# Patient Record
Sex: FEMALE | Race: WHITE | NOT HISPANIC OR LATINO
[De-identification: names, ages, dates, MRNs, and addresses within clinical notes are randomized per-mention and may not be internally consistent; named-entity substitution may affect disease eponyms.]

---

## 2018-03-12 ENCOUNTER — TRANSCRIPTION ENCOUNTER (OUTPATIENT)
Age: 23
End: 2018-03-12

## 2021-09-07 ENCOUNTER — OUTPATIENT (OUTPATIENT)
Dept: OUTPATIENT SERVICES | Facility: HOSPITAL | Age: 26
LOS: 1 days | Discharge: HOME | End: 2021-09-07
Payer: COMMERCIAL

## 2021-09-07 ENCOUNTER — RESULT REVIEW (OUTPATIENT)
Age: 26
End: 2021-09-07

## 2021-09-07 DIAGNOSIS — S83.512A SPRAIN OF ANTERIOR CRUCIATE LIGAMENT OF LEFT KNEE, INITIAL ENCOUNTER: ICD-10-CM

## 2021-09-07 DIAGNOSIS — M25.562 PAIN IN LEFT KNEE: ICD-10-CM

## 2021-09-07 PROCEDURE — 73721 MRI JNT OF LWR EXTRE W/O DYE: CPT | Mod: 26,LT

## 2021-09-20 ENCOUNTER — OUTPATIENT (OUTPATIENT)
Dept: OUTPATIENT SERVICES | Facility: HOSPITAL | Age: 26
LOS: 1 days | Discharge: HOME | End: 2021-09-20

## 2021-09-20 DIAGNOSIS — Z11.59 ENCOUNTER FOR SCREENING FOR OTHER VIRAL DISEASES: ICD-10-CM

## 2021-12-19 PROBLEM — Z00.00 ENCOUNTER FOR PREVENTIVE HEALTH EXAMINATION: Status: ACTIVE | Noted: 2021-12-19

## 2021-12-20 ENCOUNTER — APPOINTMENT (OUTPATIENT)
Dept: OBGYN | Facility: CLINIC | Age: 26
End: 2021-12-20

## 2022-01-25 ENCOUNTER — APPOINTMENT (OUTPATIENT)
Dept: OBGYN | Facility: CLINIC | Age: 27
End: 2022-01-25
Payer: COMMERCIAL

## 2022-01-25 ENCOUNTER — NON-APPOINTMENT (OUTPATIENT)
Age: 27
End: 2022-01-25

## 2022-01-25 ENCOUNTER — APPOINTMENT (OUTPATIENT)
Dept: OBGYN | Facility: CLINIC | Age: 27
End: 2022-01-25

## 2022-01-25 VITALS
WEIGHT: 153 LBS | BODY MASS INDEX: 27.45 KG/M2 | HEIGHT: 62.5 IN | HEART RATE: 86 BPM | TEMPERATURE: 98.1 F | DIASTOLIC BLOOD PRESSURE: 80 MMHG | SYSTOLIC BLOOD PRESSURE: 125 MMHG

## 2022-01-25 DIAGNOSIS — Z01.419 ENCOUNTER FOR GYNECOLOGICAL EXAMINATION (GENERAL) (ROUTINE) W/OUT ABNORMAL FINDINGS: ICD-10-CM

## 2022-01-25 DIAGNOSIS — N94.10 UNSPECIFIED DYSPAREUNIA: ICD-10-CM

## 2022-01-25 DIAGNOSIS — Z11.3 ENCOUNTER FOR SCREENING FOR INFECTIONS WITH A PREDOMINANTLY SEXUAL MODE OF TRANSMISSION: ICD-10-CM

## 2022-01-25 DIAGNOSIS — K59.00 CONSTIPATION, UNSPECIFIED: ICD-10-CM

## 2022-01-25 DIAGNOSIS — N94.6 DYSMENORRHEA, UNSPECIFIED: ICD-10-CM

## 2022-01-25 PROCEDURE — 99395 PREV VISIT EST AGE 18-39: CPT | Mod: 25

## 2022-01-25 PROCEDURE — 76830 TRANSVAGINAL US NON-OB: CPT

## 2022-01-25 NOTE — HISTORY OF PRESENT ILLNESS
[Localized] : localized [Menses] : menses [[place]] : intercourse [Bowel Movement] : bowel movement [FreeTextEntry1] : PERIODS ARE VERY PAINFUL AT TIMES...LIKE GETTING STABBED\par NOT AS HEAVY AS PREVIOUSLY\par \par LAST JENNIFER VISIT:\par \par \par    	Print\par Patient\par Name	ANAI LOPES (25yo, F) ID# [id number]	Appt. 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 details\par Prescription: [facility] - Member is eligible. details\par Prescription: [facility] - Member is eligible. details\par Chief Complaint\par Followup: Irregular periods\par \par oral hsv\par Patient's Care Team\par Primary Care Provider: MORENA RAYMUNDO: [address] [place] [address], Ph [phone], Fax [phone] NPI: [id number]\par Patient's Pharmacies\par [id number] [address] [facility] [address], Ph [phone], Fax [phone]\par Vitals\par 2020 12:45 pm\par Ht:	5 ft 2 in\par Wt:	155 lbs\par BMI:	28.3\par Allergies\par Reviewed Allergies\par NKDA\par Medications\par Reviewed Medications\par amoxicillin 875 mg tablet\par 18   filled	[facility]\par Amphetamine Salt Combo 10 mg tablet\par 09/25/15   filled	[facility]\par Amphetamine Salt Combo 20 mg tablet\par 12/10/15   filled	[facility]\par Amphetamine Salt Combo 5 mg tablet\par 02/12/15   filled	[facility]\par cephALEXin 500 mg capsule\par 17   filled	[facility]\par clindamycin 100 mg vaginal suppository\par Insert 1 suppositor(y/ies) every day by vaginal route as directed for 3 days.\par 10/23/17   prescribed	Javier Juarez MD\par clindamycin 2 % vaginal cream\par 10/23/17   filled	[facility]\par meloxicam 15 mg tablet\par 19   filled	[facility]\par metroNIDAZOLE 500 mg tablet\par Take 1 tablet(s) every 12 hours by oral route for 7 days.\par 18   filled	[facility]\par mupirocin 2 % topical ointment\par 17   filled	[facility]\par norethin-ethinyl estradioL-iron 0.8 mg-25 mcg(24)/75 mg(4) chew tablet\par Chew 1 tablet(s) every day by oral route for 84 days.\par 16   prescribed	Javier Juarez MD\par norgestimate-ethinyl estradioL 0.18 mg/0.215mg/0.25mg-35 mcg(28)tablet\par Take 1 tablet(s) by oral route as directed for 84 days.\par 20   prescribed	Javier Juarez MD\par urea 40 % topical cream\par 17   filled	[facility]\par valACYclovir 1 gram tablet\par Take 1 tablet(s) every 12 hours by oral route for 3 days.\par 20   prescribed	Javier Juarez MD\par Problems\par Reviewed Problems\par Dyspareunia\par Dysmenorrhea\par Irregular periods\par Family History\par Reviewed Family History\par Mother	- Well adult\par Father	- Well adult\par Social History\par Reviewed Social History\par Tobacco Smoking Status: Former smoker (Notes: former socially)\par Non-smoker\par Most Recent Tobacco Use Screenin2018\par Surgical History\par Reviewed Surgical History\par NONE\par GYN History\par Reviewed GYN History\par Duration of Flow (days): 5 (Notes: WACKY PAINFUL,).\par LMP: Approximate.\par Frequency of Cycle (Q days): (Notes: IRREG).\par Menses Monthly: Y.\par Flow: Heavy.\par Current Birth Control Method: BCPs.\par Date of LMP: 10/07/2018 (Notes: has irregular periods).\par PERIODS TOTALLY OUT OF CONTROL\par Obstetric History\par Reviewed Obstetric History\par TOTAL	FULL	PRE	AB. I	AB. S	ECTOPICS	MULTIPLE	LIVING\par 0							\par Past Medical History\par Reviewed Past Medical History\par Screening\par None recorded.\par HPI\par menorrhagia/irreg menses/cold sores\par \par ROS\par Patient reports abnormal bleeding but reports no hematuria, no flank pain, no trouble urinating, no incontinence, no rash, no lesion, no discharge, no vaginal odor, and no vaginal itching. She reports no fatigue, no fever, no significant weight gain, and no significant weight loss. She reports no abnormal moles and no rashes. She reports no irritation and no vision changes. She reports no hearing loss, no ear pain, no nose/sinus problems, no sore throat, no snoring, no dry mouth, and no mouth ulcers. She reports no dyspnea / shortness of breath, no cough, no sputum production, no hemoptysis, and no wheezing. She reports no chest pain, no palpitations, and no orthopnea. She reports no heartburn, no dysphagia, no nausea, no vomiting, no abdominal pain, no bowel movement changes, no diarrhea, no constipation, and no rectal bleeding. She reports no menstrual problems and no PMDD symptoms. She reports no menopausal symptoms. She reports no sexual problems. She reports no muscle aches, no muscle weakness, no arthralgias/joint pain, and no back pain. She reports no headaches, no dizziness, no LOC, no weakness, no numbness, and no seizures. She reports no depression, no alcoholism, and no sleep disturbances.\par Assessment / Plan\par 1. Irregular periods -\par 25 minute telehealth visit\par \par N92.6: Irregular menstruation, unspecified\par TELEHEALTH VIDEO CONSULT -     Schedule Within: provider's discretion\par Urgency: irreg menses, cold sores\par \par 2. Menorrhagia -\par will consider: mirena/nexplanon\par \par lengthy discussion about each one\par \par N92.0: Excessive and frequent menstruation with regular cycle\par norgestimate-ethinyl estradiol 0.18 mg/0.215mg/0.25mg-35 mcg(28)tablet - Take 1 tablet(s) by oral route as directed for 84 days.     Qty: 84 tablet(s)     Refills: 1     Pharmacy: [id number]\par \par 3. Lesion of oral mucosa -\par discussed sexual implications in detail re spread\par \par K13.70: Unspecified lesions of oral mucosa\par valacyclovir 1 gram tablet - Take 1 tablet(s) every 12 hours by oral route for 3 days.     Qty: 6 tablet(s)     Refills: 3     Pharmacy: [facility] #[id number]\par \par \par Return to Office\par None recorded.\par Encounter Sign-Off\par Encounter signed-off by Javier Juarez MD, 2020.\par Encounter performed and documented by Javier Juarez MD\par Encounter reviewed & signed by Javier Juarez MD on 2020 at 1:00pm

## 2022-01-25 NOTE — PHYSICAL EXAM
[Examination Of The Breasts] : a normal appearance [No Masses] : no breast masses were palpable [Labia Majora] : normal [Labia Minora] : normal [Normal] : normal [Tenderness] : tender [Uterine Adnexae] : normal

## 2022-01-25 NOTE — PROCEDURE
[Pelvic Pain] : pelvic pain [Transvaginal Ultrasound] : transvaginal ultrasound [FreeTextEntry3] : CERVIX NORMAL\par NO FREE FLUID [FreeTextEntry5] : 54CC VOL.   ENDO;1.9CC [FreeTextEntry7] : 11.6CC [FreeTextEntry8] : 13.5CC

## 2022-01-28 ENCOUNTER — NON-APPOINTMENT (OUTPATIENT)
Age: 27
End: 2022-01-28

## 2022-02-03 ENCOUNTER — NON-APPOINTMENT (OUTPATIENT)
Age: 27
End: 2022-02-03

## 2022-02-06 LAB — CYTOLOGY CVX/VAG DOC THIN PREP: ABNORMAL

## 2022-02-07 ENCOUNTER — NON-APPOINTMENT (OUTPATIENT)
Age: 27
End: 2022-02-07

## 2022-03-04 ENCOUNTER — OUTPATIENT (OUTPATIENT)
Dept: OUTPATIENT SERVICES | Facility: HOSPITAL | Age: 27
LOS: 1 days | Discharge: HOME | End: 2022-03-04

## 2022-03-04 ENCOUNTER — NON-APPOINTMENT (OUTPATIENT)
Age: 27
End: 2022-03-04

## 2022-03-04 VITALS
OXYGEN SATURATION: 100 % | SYSTOLIC BLOOD PRESSURE: 121 MMHG | HEART RATE: 68 BPM | HEIGHT: 63 IN | TEMPERATURE: 99 F | RESPIRATION RATE: 15 BRPM | WEIGHT: 158.95 LBS | DIASTOLIC BLOOD PRESSURE: 67 MMHG

## 2022-03-04 DIAGNOSIS — Z01.818 ENCOUNTER FOR OTHER PREPROCEDURAL EXAMINATION: ICD-10-CM

## 2022-03-04 DIAGNOSIS — N94.6 DYSMENORRHEA, UNSPECIFIED: ICD-10-CM

## 2022-03-04 DIAGNOSIS — Z98.890 OTHER SPECIFIED POSTPROCEDURAL STATES: Chronic | ICD-10-CM

## 2022-03-04 LAB
ALBUMIN SERPL ELPH-MCNC: 4.5 G/DL — SIGNIFICANT CHANGE UP (ref 3.5–5.2)
ALP SERPL-CCNC: 52 U/L — SIGNIFICANT CHANGE UP (ref 30–115)
ALT FLD-CCNC: 18 U/L — SIGNIFICANT CHANGE UP (ref 0–41)
ANION GAP SERPL CALC-SCNC: 9 MMOL/L — SIGNIFICANT CHANGE UP (ref 7–14)
APTT BLD: 37.2 SEC — SIGNIFICANT CHANGE UP (ref 27–39.2)
AST SERPL-CCNC: 19 U/L — SIGNIFICANT CHANGE UP (ref 0–41)
BASOPHILS # BLD AUTO: 0.04 K/UL — SIGNIFICANT CHANGE UP (ref 0–0.2)
BASOPHILS NFR BLD AUTO: 0.5 % — SIGNIFICANT CHANGE UP (ref 0–1)
BILIRUB SERPL-MCNC: 0.5 MG/DL — SIGNIFICANT CHANGE UP (ref 0.2–1.2)
BUN SERPL-MCNC: 11 MG/DL — SIGNIFICANT CHANGE UP (ref 10–20)
CALCIUM SERPL-MCNC: 9.6 MG/DL — SIGNIFICANT CHANGE UP (ref 8.5–10.1)
CHLORIDE SERPL-SCNC: 102 MMOL/L — SIGNIFICANT CHANGE UP (ref 98–110)
CO2 SERPL-SCNC: 27 MMOL/L — SIGNIFICANT CHANGE UP (ref 17–32)
CREAT SERPL-MCNC: 0.9 MG/DL — SIGNIFICANT CHANGE UP (ref 0.7–1.5)
EGFR: 90 ML/MIN/1.73M2 — SIGNIFICANT CHANGE UP
EOSINOPHIL # BLD AUTO: 0.08 K/UL — SIGNIFICANT CHANGE UP (ref 0–0.7)
EOSINOPHIL NFR BLD AUTO: 1 % — SIGNIFICANT CHANGE UP (ref 0–8)
GLUCOSE SERPL-MCNC: 77 MG/DL — SIGNIFICANT CHANGE UP (ref 70–99)
HCT VFR BLD CALC: 42.2 % — SIGNIFICANT CHANGE UP (ref 37–47)
HGB BLD-MCNC: 14.4 G/DL — SIGNIFICANT CHANGE UP (ref 12–16)
IMM GRANULOCYTES NFR BLD AUTO: 0.6 % — HIGH (ref 0.1–0.3)
INR BLD: 1.11 RATIO — SIGNIFICANT CHANGE UP (ref 0.65–1.3)
LYMPHOCYTES # BLD AUTO: 1.91 K/UL — SIGNIFICANT CHANGE UP (ref 1.2–3.4)
LYMPHOCYTES # BLD AUTO: 24.4 % — SIGNIFICANT CHANGE UP (ref 20.5–51.1)
MCHC RBC-ENTMCNC: 31.9 PG — HIGH (ref 27–31)
MCHC RBC-ENTMCNC: 34.1 G/DL — SIGNIFICANT CHANGE UP (ref 32–37)
MCV RBC AUTO: 93.4 FL — SIGNIFICANT CHANGE UP (ref 81–99)
MONOCYTES # BLD AUTO: 0.59 K/UL — SIGNIFICANT CHANGE UP (ref 0.1–0.6)
MONOCYTES NFR BLD AUTO: 7.5 % — SIGNIFICANT CHANGE UP (ref 1.7–9.3)
NEUTROPHILS # BLD AUTO: 5.16 K/UL — SIGNIFICANT CHANGE UP (ref 1.4–6.5)
NEUTROPHILS NFR BLD AUTO: 66 % — SIGNIFICANT CHANGE UP (ref 42.2–75.2)
NRBC # BLD: 0 /100 WBCS — SIGNIFICANT CHANGE UP (ref 0–0)
PLATELET # BLD AUTO: 213 K/UL — SIGNIFICANT CHANGE UP (ref 130–400)
POTASSIUM SERPL-MCNC: 4.7 MMOL/L — SIGNIFICANT CHANGE UP (ref 3.5–5)
POTASSIUM SERPL-SCNC: 4.7 MMOL/L — SIGNIFICANT CHANGE UP (ref 3.5–5)
PROT SERPL-MCNC: 7.2 G/DL — SIGNIFICANT CHANGE UP (ref 6–8)
PROTHROM AB SERPL-ACNC: 12.8 SEC — SIGNIFICANT CHANGE UP (ref 9.95–12.87)
RBC # BLD: 4.52 M/UL — SIGNIFICANT CHANGE UP (ref 4.2–5.4)
RBC # FLD: 12 % — SIGNIFICANT CHANGE UP (ref 11.5–14.5)
SODIUM SERPL-SCNC: 138 MMOL/L — SIGNIFICANT CHANGE UP (ref 135–146)
WBC # BLD: 7.83 K/UL — SIGNIFICANT CHANGE UP (ref 4.8–10.8)
WBC # FLD AUTO: 7.83 K/UL — SIGNIFICANT CHANGE UP (ref 4.8–10.8)

## 2022-03-04 NOTE — H&P PST ADULT - HISTORY OF PRESENT ILLNESS
PT PRESENTS TO PAST WITH NO SOB, CP, PALPITATIONS, DYSURIA, UTI OR URI AT PRESENT.   PT ABLE TO WALK UP 2-3 FLIGHTS OF STEPS WITH NO SOB.  AS PER THE PT, THIS IS HIS/HER COMPLETE MEDICAL AND SURGICAL HX, INCLUDING MEDICATIONS PRESCRIBED AND OVER THE COUNTER  pt denies any covid s/s, or tested positive in the past--PT IS AWARE OF DATE AND TIME OF COVID TESTING PRIOR TO SURGERY.   pt advised self quarantine till day of procedure  denies travel outside the [place] in the past 30 days  Anesthesia Alert  NO--Difficult Airway  NO--History of neck surgery or radiation  NO--Limited ROM of neck  NO--History of Malignant hyperthermia  NO--Personal or family history of Pseudocholinesterase deficiency  NO--Prior Anesthesia Complication  NO--Latex Allergy  NO--Loose teeth  NO--History of Rheumatoid Arthritis  NO--ALBERTO  NO BLEEDING RISK  NO--Other_____

## 2022-03-04 NOTE — H&P PST ADULT - REASON FOR ADMISSION
Procedure: PELVIC EXAM UNDER ANESTHESIA    DIAGNOSTIC LAPAROSCOPY, POSSIBLE EXCISION OF ENDOMETRIOSIS   120 Minutes Anesthesia Type: General  PT REPORTS--I HAVE WITH MY PERIOD AND WHEN I HAVE SEX.    I HAVE HAD PAIN FOR 5 YRS.   THE PAIN IS A 5/10.   THE PAIN IS A PRESSURE, THROBBING AND RADIATING PAIN.   THE PAIN RADIATES AROUND MY PELVIS.  THE PAIN SUBSIDES WHEN MY PERIODS END. Procedure: PELVIC EXAM UNDER ANESTHESIA    DIAGNOSTIC LAPAROSCOPY, POSSIBLE EXCISION OF ENDOMETRIOSIS   120 Minutes Anesthesia Type: General  PT REPORTS--I HAVE PAIN  WITH MY PERIOD AND WHEN I HAVE SEX.    I HAVE HAD PAIN FOR 5 YRS.   THE PAIN IS A 5/10.   THE PAIN IS A PRESSURE, THROBBING AND RADIATING PAIN.   THE PAIN RADIATES AROUND MY PELVIS.  THE PAIN SUBSIDES WHEN MY PERIODS END.

## 2022-03-06 LAB
HBV SURFACE AG SER QL: NONREACTIVE
HIV1+2 AB SPEC QL IA.RAPID: NONREACTIVE
T PALLIDUM AB SER QL IA: NEGATIVE

## 2022-03-16 PROBLEM — Z78.9 OTHER SPECIFIED HEALTH STATUS: Chronic | Status: ACTIVE | Noted: 2022-03-04

## 2022-03-21 ENCOUNTER — NON-APPOINTMENT (OUTPATIENT)
Age: 27
End: 2022-03-21

## 2022-03-22 ENCOUNTER — APPOINTMENT (OUTPATIENT)
Dept: OBGYN | Facility: CLINIC | Age: 27
End: 2022-03-22

## 2022-03-25 ENCOUNTER — APPOINTMENT (OUTPATIENT)
Dept: OBGYN | Facility: HOSPITAL | Age: 27
End: 2022-03-25

## 2022-04-05 ENCOUNTER — APPOINTMENT (OUTPATIENT)
Dept: OBGYN | Facility: CLINIC | Age: 27
End: 2022-04-05

## 2022-04-12 ENCOUNTER — NON-APPOINTMENT (OUTPATIENT)
Age: 27
End: 2022-04-12

## 2022-05-03 ENCOUNTER — APPOINTMENT (OUTPATIENT)
Dept: OBGYN | Facility: CLINIC | Age: 27
End: 2022-05-03

## 2024-07-12 ENCOUNTER — NON-APPOINTMENT (OUTPATIENT)
Age: 29
End: 2024-07-12

## 2024-10-29 ENCOUNTER — OUTPATIENT (OUTPATIENT)
Dept: OUTPATIENT SERVICES | Facility: HOSPITAL | Age: 29
LOS: 1 days | End: 2024-10-29
Payer: COMMERCIAL

## 2024-10-29 DIAGNOSIS — R51.9 HEADACHE, UNSPECIFIED: ICD-10-CM

## 2024-10-29 DIAGNOSIS — Z98.890 OTHER SPECIFIED POSTPROCEDURAL STATES: Chronic | ICD-10-CM

## 2024-10-29 DIAGNOSIS — Z00.8 ENCOUNTER FOR OTHER GENERAL EXAMINATION: ICD-10-CM

## 2024-10-29 PROCEDURE — 70553 MRI BRAIN STEM W/O & W/DYE: CPT

## 2024-10-29 PROCEDURE — 70553 MRI BRAIN STEM W/O & W/DYE: CPT | Mod: 26

## 2024-10-29 PROCEDURE — A9579: CPT

## 2024-10-30 DIAGNOSIS — R51.9 HEADACHE, UNSPECIFIED: ICD-10-CM

## 2025-01-06 ENCOUNTER — APPOINTMENT (OUTPATIENT)
Dept: OBGYN | Facility: CLINIC | Age: 30
End: 2025-01-06
Payer: COMMERCIAL

## 2025-01-06 VITALS
BODY MASS INDEX: 31.4 KG/M2 | HEART RATE: 72 BPM | WEIGHT: 175 LBS | HEIGHT: 62.5 IN | SYSTOLIC BLOOD PRESSURE: 115 MMHG | DIASTOLIC BLOOD PRESSURE: 67 MMHG

## 2025-01-06 DIAGNOSIS — Z01.419 ENCOUNTER FOR GYNECOLOGICAL EXAMINATION (GENERAL) (ROUTINE) W/OUT ABNORMAL FINDINGS: ICD-10-CM

## 2025-01-06 DIAGNOSIS — Z20.2 CONTACT WITH AND (SUSPECTED) EXPOSURE TO INFECTIONS WITH A PREDOMINANTLY SEXUAL MODE OF TRANSMISSION: ICD-10-CM

## 2025-01-06 DIAGNOSIS — N94.10 UNSPECIFIED DYSPAREUNIA: ICD-10-CM

## 2025-01-06 DIAGNOSIS — N94.6 DYSMENORRHEA, UNSPECIFIED: ICD-10-CM

## 2025-01-06 LAB
BILIRUB UR QL STRIP: NORMAL
CLARITY UR: CLEAR
COLLECTION METHOD: NORMAL
GLUCOSE UR-MCNC: NORMAL
HCG UR QL: 0.2 EU/DL
HGB UR QL STRIP.AUTO: NORMAL
KETONES UR-MCNC: NORMAL
LEUKOCYTE ESTERASE UR QL STRIP: NORMAL
NITRITE UR QL STRIP: NORMAL
PH UR STRIP: 6
PROT UR STRIP-MCNC: NORMAL
SP GR UR STRIP: 1

## 2025-01-06 PROCEDURE — 99459 PELVIC EXAMINATION: CPT

## 2025-01-06 PROCEDURE — 81003 URINALYSIS AUTO W/O SCOPE: CPT | Mod: QW

## 2025-01-06 PROCEDURE — 99205 OFFICE O/P NEW HI 60 MIN: CPT | Mod: 25

## 2025-01-06 PROCEDURE — 76830 TRANSVAGINAL US NON-OB: CPT

## 2025-01-06 PROCEDURE — 99385 PREV VISIT NEW AGE 18-39: CPT | Mod: 25

## 2025-01-11 LAB
C TRACH RRNA SPEC QL NAA+PROBE: NOT DETECTED
HPV HIGH+LOW RISK DNA PNL CVX: NOT DETECTED
N GONORRHOEA RRNA SPEC QL NAA+PROBE: NOT DETECTED
SOURCE TP AMPLIFICATION: NORMAL

## 2025-01-18 LAB — CYTOLOGY CVX/VAG DOC THIN PREP: NORMAL
